# Patient Record
Sex: MALE | Race: BLACK OR AFRICAN AMERICAN | Employment: PART TIME | URBAN - METROPOLITAN AREA
[De-identification: names, ages, dates, MRNs, and addresses within clinical notes are randomized per-mention and may not be internally consistent; named-entity substitution may affect disease eponyms.]

---

## 2021-04-20 ENCOUNTER — HOSPITAL ENCOUNTER (EMERGENCY)
Age: 41
Discharge: HOME | End: 2021-04-20

## 2021-04-20 VITALS
BODY MASS INDEX: 27.16 KG/M2 | OXYGEN SATURATION: 96 % | HEART RATE: 76 BPM | TEMPERATURE: 98.5 F | DIASTOLIC BLOOD PRESSURE: 74 MMHG | RESPIRATION RATE: 18 BRPM | SYSTOLIC BLOOD PRESSURE: 138 MMHG | WEIGHT: 169 LBS | HEIGHT: 66 IN

## 2021-04-20 DIAGNOSIS — N34.2 URETHRITIS: ICD-10-CM

## 2021-04-20 DIAGNOSIS — Z20.2 POSSIBLE EXPOSURE TO STD: Primary | ICD-10-CM

## 2021-04-20 LAB
BACTERIA: ABNORMAL /HPF
BILIRUBIN URINE: NEGATIVE
BLOOD, URINE: ABNORMAL
CLARITY: ABNORMAL
COLOR: YELLOW
GLUCOSE URINE: NEGATIVE MG/DL
KETONES, URINE: ABNORMAL MG/DL
LEUKOCYTE ESTERASE, URINE: ABNORMAL
NITRITE, URINE: NEGATIVE
PH UA: 5.5 (ref 5–9)
PROTEIN UA: ABNORMAL MG/DL
RBC UA: ABNORMAL /HPF (ref 0–2)
SPECIFIC GRAVITY UA: >=1.03 (ref 1–1.03)
UROBILINOGEN, URINE: 0.2 E.U./DL
WBC UA: ABNORMAL /HPF (ref 0–5)

## 2021-04-20 PROCEDURE — 96372 THER/PROPH/DIAG INJ SC/IM: CPT

## 2021-04-20 PROCEDURE — 99283 EMERGENCY DEPT VISIT LOW MDM: CPT

## 2021-04-20 PROCEDURE — 87591 N.GONORRHOEAE DNA AMP PROB: CPT

## 2021-04-20 PROCEDURE — 6370000000 HC RX 637 (ALT 250 FOR IP): Performed by: PHYSICIAN ASSISTANT

## 2021-04-20 PROCEDURE — 6360000002 HC RX W HCPCS: Performed by: PHYSICIAN ASSISTANT

## 2021-04-20 PROCEDURE — 87491 CHLMYD TRACH DNA AMP PROBE: CPT

## 2021-04-20 PROCEDURE — 2500000003 HC RX 250 WO HCPCS: Performed by: PHYSICIAN ASSISTANT

## 2021-04-20 PROCEDURE — 81001 URINALYSIS AUTO W/SCOPE: CPT

## 2021-04-20 RX ORDER — ONDANSETRON 4 MG/1
4 TABLET, ORALLY DISINTEGRATING ORAL ONCE
Status: COMPLETED | OUTPATIENT
Start: 2021-04-20 | End: 2021-04-20

## 2021-04-20 RX ORDER — AZITHROMYCIN 250 MG/1
1000 TABLET, FILM COATED ORAL ONCE
Status: COMPLETED | OUTPATIENT
Start: 2021-04-20 | End: 2021-04-20

## 2021-04-20 RX ADMIN — ONDANSETRON 4 MG: 4 TABLET, ORALLY DISINTEGRATING ORAL at 13:17

## 2021-04-20 RX ADMIN — AZITHROMYCIN 1000 MG: 250 TABLET, FILM COATED ORAL at 13:17

## 2021-04-20 RX ADMIN — LIDOCAINE HYDROCHLORIDE 500 MG: 10 INJECTION, SOLUTION EPIDURAL; INFILTRATION; INTRACAUDAL; PERINEURAL at 13:18

## 2021-04-20 NOTE — ED PROVIDER NOTES
[address]  Department of Emergency Medicine   ED  Encounter Note  Admit Date/RoomTime: 2021 12:48 PM  ED Room: [facility]    NAME: Kacey Tao  : 1980  MRN: [id number]     Chief Complaint:  Exposure to STD (pt states white drainage from penis when using bathroom this am)    History of Present Illness      Kacey Tao is a 41 y.o. old male who presents to the emergency department by private vehicle, for evaluation of dysuria and penile discharge beginning yesterday. The pt reports that he had a new female sexual partner 5 days ago and did not use protection. The pt notes the discharge is white. Since exposure/onset there has been the development of symptoms including dysuria and discharge. He denies any genital rash, genital ulcers, scrotal pain, fever, abdominal pain, back pain, urinary frequency or hematuria. His prior STD history: Chlamydia. ROS   Pertinent positives and negatives are stated within HPI, all other systems reviewed and are negative. Past Medical History:  has no past medical history on file. Surgical History:  has no past surgical history on file. Social History:  reports that he has never smoked. He has never used smokeless tobacco. He reports current alcohol use of about 1.0 standard drinks of alcohol per week. He reports that he does not use drugs. Family History: family history is not on file. Allergies: Patient has no known allergies. Physical Exam   Oxygen Saturation Interpretation: Normal.        ED Triage Vitals   BP Temp Temp Source Pulse Resp SpO2 Height Weight   21 1249 21 1227 21 1249 21 1227 21 1249 21 1227 21 1249 21 1249   (!) 149/81 97.8 °F (36.6 °C) Temporal 81 18 96 % 5' 6\" (1.676 m) 169 lb (76.7 kg)         · Constitutional:  Alert, development consistent with age. In no acute distress. · HEENT:  NC/NT. Airway patent  · Neck:  Normal ROM. Supple. · Respiratory:  Lungs Clear to auscultation and breath sounds equal.  · CV:  Regular rate and rhythm, normal heart sounds, without pathological murmurs, ectopy, gallops, or rubs. · GI:  AbdomenSoft, nontender, good bowel sounds. No firm or pulsatile mass. · : RN was present at time of exam         Penis: discharge white . No ulceration, scrotal masses, abnormalities noted         Scrotum: normal.           Testicle: normal without masses bilateral.  · Integument:  Normal turgor. Warm, dry, without visible rash, unless noted elsewhere.  Lymphatics: No lymphangitis or adenopathy noted. · Neurological:  Orientation age-appropriate. Motor functions intact. Lab / Imaging Results   (All laboratory and radiology results have been personally reviewed by myself)  Labs:  Results for orders placed or performed during the hospital encounter of 04/20/21   C.trachomatis N.gonorrhoeae DNA, Urine    Specimen: Urine   Result Value Ref Range    Source Urine    Urinalysis with Microscopic   Result Value Ref Range    Color, UA Yellow Straw/Yellow    Clarity, UA SL CLOUDY Clear    Glucose, Ur Negative Negative mg/dL    Bilirubin Urine Negative Negative    Ketones, Urine TRACE (A) Negative mg/dL    Specific Gravity, UA >=1.030 1.005 - 1.030    Blood, Urine TRACE-INTACT Negative    pH, UA 5.5 5.0 - 9.0    Protein, UA TRACE Negative mg/dL    Urobilinogen, Urine 0.2 <2.0 E.U./dL    Nitrite, Urine Negative Negative    Leukocyte Esterase, Urine SMALL (A) Negative    WBC, UA 2-5 0 - 5 /HPF    RBC, UA 2-5 0 - 2 /HPF    Bacteria, UA RARE (A) None Seen /HPF     Imaging: All Radiology results interpreted by Radiologist unless otherwise noted.   No orders to display     ED Course / Medical Decision Making     Medications   cefTRIAXone (ROCEPHIN) 500 mg in lidocaine 1 % 1.43 mL IM Injection (500 mg Intramuscular Given 4/20/21 1318)   azithromycin (ZITHROMAX) tablet 1,000 mg (1,000 mg Oral Given 4/20/21 1317)   ondansetron (ZOFRAN-ODT) disintegrating tablet 4 mg (4 mg Oral Given 4/20/21 2451)        Consult(s):   None    Procedure(s):   none    Medical Decision Making:   Pt is a 40 y/o male presenting to the ED for evaluation of penile discharge and dysuria beginning yesterday. The pt had a new sexual partner 5 days ago and did not use protection. The pt denies genital rash, genital ulcers, scrotal pain, fever, abdominal pain, back pain, urinary frequency or hematuria. On exam, pt noted to have white penile discharge. No lesions or ulcers noted, no testicular tenderness. UA consistent with STD, no concern for UTI at this time. Cultures were sent. Pt instructed to check MyChart for updated results. Pt would like to be treated prophylactically. Pt given ceftriaxone, azithromycin, and zofran. Plan to obtain cultures and treat for suspected STD and provide outpatient follow-up instructions. Pt educated on safe sex practices. Pt instructed to avoid sexual intercourse for the next 2 weeks. Advised pt to check MyChart and if positive, to share with his partner. Pt should return to the ED for new or worsening symptoms like urinary retention, penile pain, fever, chills, abdominal pain, back pain. Patient verbalized understanding and was in agreement with plan of discharge. Patient was explicitly instructed on specific signs and symptoms on which to return to the emergency room for. Patient was instructed to return to the ER for any new or worsening symptoms. Additional discharge instructions were given verbally. All questions were answered. Patient is comfortable and agreeable with discharge plan. Patient in no acute distress and non-toxic in appearance. Plan of Care/Counseling:  I reviewed today's visit with the patient in addition to providing specific details for the plan of care and counseling regarding the diagnosis and prognosis. Questions are answered at this time and are agreeable with the plan. Assessment     1.  Possible

## 2021-04-23 LAB
C. TRACHOMATIS DNA ,URINE: ABNORMAL
N. GONORRHOEAE DNA, URINE: ABNORMAL
SOURCE: ABNORMAL

## 2021-07-19 ENCOUNTER — HOSPITAL ENCOUNTER (EMERGENCY)
Age: 41
Discharge: LEFT BEFORE DISCHARGE | End: 2021-07-19

## 2021-07-19 VITALS — HEART RATE: 86 BPM | OXYGEN SATURATION: 97 % | RESPIRATION RATE: 16 BRPM | TEMPERATURE: 97.1 F

## 2021-07-20 ENCOUNTER — HOSPITAL ENCOUNTER (EMERGENCY)
Age: 41
Discharge: HOME | End: 2021-07-20
Attending: EMERGENCY MEDICINE

## 2021-07-20 VITALS
BODY MASS INDEX: 27.16 KG/M2 | DIASTOLIC BLOOD PRESSURE: 78 MMHG | HEIGHT: 66 IN | TEMPERATURE: 97.8 F | OXYGEN SATURATION: 99 % | WEIGHT: 169 LBS | SYSTOLIC BLOOD PRESSURE: 121 MMHG | RESPIRATION RATE: 16 BRPM | HEART RATE: 82 BPM

## 2021-07-20 DIAGNOSIS — A64 STD (MALE): Primary | ICD-10-CM

## 2021-07-20 LAB
BACTERIA: ABNORMAL /HPF
BILIRUBIN URINE: NEGATIVE
BLOOD, URINE: ABNORMAL
CLARITY: ABNORMAL
COLOR: YELLOW
GLUCOSE URINE: NEGATIVE MG/DL
KETONES, URINE: NEGATIVE MG/DL
LEUKOCYTE ESTERASE, URINE: ABNORMAL
NITRITE, URINE: NEGATIVE
PH UA: 6 (ref 5–9)
PROTEIN UA: NEGATIVE MG/DL
RBC UA: ABNORMAL /HPF (ref 0–2)
SPECIFIC GRAVITY UA: 1.02 (ref 1–1.03)
UROBILINOGEN, URINE: 1 E.U./DL
WBC UA: ABNORMAL /HPF (ref 0–5)

## 2021-07-20 PROCEDURE — 87591 N.GONORRHOEAE DNA AMP PROB: CPT

## 2021-07-20 PROCEDURE — 81001 URINALYSIS AUTO W/SCOPE: CPT

## 2021-07-20 PROCEDURE — 99283 EMERGENCY DEPT VISIT LOW MDM: CPT

## 2021-07-20 PROCEDURE — 96372 THER/PROPH/DIAG INJ SC/IM: CPT

## 2021-07-20 PROCEDURE — 6370000000 HC RX 637 (ALT 250 FOR IP): Performed by: EMERGENCY MEDICINE

## 2021-07-20 PROCEDURE — 87491 CHLMYD TRACH DNA AMP PROBE: CPT

## 2021-07-20 PROCEDURE — 2500000003 HC RX 250 WO HCPCS: Performed by: EMERGENCY MEDICINE

## 2021-07-20 PROCEDURE — 6360000002 HC RX W HCPCS: Performed by: EMERGENCY MEDICINE

## 2021-07-20 RX ORDER — DOXYCYCLINE HYCLATE 100 MG/1
100 CAPSULE ORAL ONCE
Status: COMPLETED | OUTPATIENT
Start: 2021-07-20 | End: 2021-07-20

## 2021-07-20 RX ORDER — DOXYCYCLINE HYCLATE 100 MG
100 TABLET ORAL 2 TIMES DAILY
Qty: 14 TABLET | Refills: 0 | Status: SHIPPED | OUTPATIENT
Start: 2021-07-20 | End: 2021-07-27

## 2021-07-20 RX ADMIN — LIDOCAINE HYDROCHLORIDE 500 MG: 10 INJECTION, SOLUTION EPIDURAL; INFILTRATION; INTRACAUDAL; PERINEURAL at 08:51

## 2021-07-20 RX ADMIN — DOXYCYCLINE HYCLATE 100 MG: 100 CAPSULE ORAL at 08:51

## 2021-07-20 NOTE — PROGRESS NOTES
CLINICAL PHARMACY NOTE: MEDS TO BEDS    Total # of Prescriptions Filled: 1   The following medications were delivered to the patient:  · Doxycycline hyclate 100 mg    Additional Documentation:

## 2021-07-20 NOTE — ED NOTES
To [facility] area to provide specimen cup for ordered UA. Pt noted to be absent from assigned area. This nurse to speak with [facility] staff. Per [facility] staff, pt reported that he had to run to his car for something.      Boom Bejarano RN  07/20/21 0700

## 2021-07-22 LAB
C. TRACHOMATIS DNA ,URINE: NEGATIVE
N. GONORRHOEAE DNA, URINE: ABNORMAL
SOURCE: ABNORMAL

## 2021-07-22 NOTE — ED PROVIDER NOTES
DAVID Cronin is a 35 y.o. male with a PMHx significant for no chronic medical problems who presents with concerns for an STD. His complaint is persistent, mild in severity and worsened by urination. He states he had unprotected sex with a female partner 4-5 days ago and has since developed dysuria and abnormal drainage from his urethra. He denies prior history of STDs. He also denies recent trauma, fever, chills, fatigue, HA, dizziness, vision changes, congestion, rhinorrhea, neck pain, chest pain, palpitations, hx of MI, hx of blood clots, LE edema, SOB, cough, wheezing, abdominal pain, N/V/D/C, hematochezia, melena, hematuria, generalized weakness and paresthesias. The patient is currently taking no blood thinners. Tobacco Hx:   reports that he has never smoked. He has never used smokeless tobacco.    Alcohol Hx:   reports current alcohol use of about 1.0 standard drinks of alcohol per week. Illicit Drug Hx:  Reports no hx of illicit drug use. The history is provided by the patient. Last Tetanus (if applicable): n/a    Review of Systems   Constitutional: Negative for chills, diaphoresis, fatigue and fever. HENT: Negative for congestion, rhinorrhea and sore throat. Eyes: Negative for pain, discharge and redness. Respiratory: Negative for cough, shortness of breath and wheezing. Cardiovascular: Negative for chest pain, palpitations and leg swelling. Gastrointestinal: Negative for abdominal distention, abdominal pain, blood in stool, constipation, diarrhea, nausea and vomiting. Genitourinary: Positive for discharge, dysuria and penile pain. Negative for difficulty urinating, flank pain, frequency, hematuria, scrotal swelling and testicular pain. Musculoskeletal: Negative for arthralgias, back pain, myalgias and neck pain. Skin: Negative for rash and wound. Neurological: Negative for dizziness, syncope, speech difficulty, weakness, light-headedness, numbness and headaches. Physical Exam  Vitals and nursing note reviewed. Constitutional:       General: He is awake. He is not in acute distress. Appearance: He is not diaphoretic. HENT:      Head: Normocephalic and atraumatic. Right Ear: External ear normal.      Left Ear: External ear normal.      Nose: Nose normal.      Mouth/Throat:      Mouth: Mucous membranes are moist.      Pharynx: Oropharynx is clear. Eyes:      General: No scleral icterus. Right eye: No discharge. Left eye: No discharge. Extraocular Movements: Extraocular movements intact. Conjunctiva/sclera: Conjunctivae normal.   Cardiovascular:      Rate and Rhythm: Normal rate and regular rhythm. Heart sounds: Normal heart sounds. No murmur heard. No friction rub. No gallop. Comments: Upper extremity and lower extremity distal pulses intact bilaterally +2/4  Pulmonary:      Effort: Pulmonary effort is normal. No respiratory distress. Breath sounds: Normal breath sounds. No wheezing, rhonchi or rales. Comments: Good air movement noted throughout. Abdominal:      General: Bowel sounds are normal. There is no distension. Palpations: Abdomen is soft. Tenderness: There is no abdominal tenderness. There is no guarding or rebound. Hernia: There is no hernia in the left inguinal area or right inguinal area. Genitourinary:     Penis: Circumcised. Tenderness and discharge (white discharge) present. No swelling or lesions. Testes: Normal.         Right: Mass, tenderness or swelling not present. Left: Mass, tenderness or swelling not present. Epididymis:      Right: Normal. No tenderness. Left: Normal. No tenderness. Musculoskeletal:         General: No tenderness or deformity. Normal range of motion. Cervical back: Normal range of motion and neck supple. No rigidity. No muscular tenderness. Right lower leg: No edema. Left lower leg: No edema.    Lymphadenopathy: Cervical: No cervical adenopathy. Skin:     General: Skin is warm and dry. Capillary Refill: Capillary refill takes less than 2 seconds. Findings: No erythema or rash. Neurological:      General: No focal deficit present. Mental Status: He is alert and oriented to person, place, and time. Sensory: No sensory deficit. Motor: No weakness. Coordination: Coordination normal.          ---------------------------------- PAST HISTORY ---------------------------------------------  Past Medical History:  has no past medical history on file. Past Surgical History:  has no past surgical history on file. Social History:  reports that he has never smoked. He has never used smokeless tobacco. He reports current alcohol use of about 1.0 standard drinks of alcohol per week. He reports that he does not use drugs. Family History: family history is not on file. Home Meds: Not in a hospital admission. The patients home medications have been reviewed. Allergies: Patient has no known allergies. ------------------------- NURSING NOTES AND VITALS REVIEWED ---------------------------  Date / Time Roomed:  7/20/2021  6:17 AM  ED Bed Assignment:  ST04/ST-4    The nursing notes within the ED encounter and vital signs as below have been reviewed. /78   Pulse 82   Temp 97.8 °F (36.6 °C)   Resp 16   Ht 5' 6\" (1.676 m)   Wt 169 lb (76.7 kg)   SpO2 99%   BMI 27.28 kg/m²   -------------------------------------------------- RESULTS / INTERVENTIONS -------------------------------------------------  All laboratory and radiology tests have been reviewed by this physician.     LABS:  Results for orders placed or performed during the hospital encounter of 07/20/21   C.trachomatis N.gonorrhoeae DNA, Urine    Specimen: Urine   Result Value Ref Range    Source Urine     C. trachomatis DNA ,Urine NEGATIVE NEG    N. gonorrhoeae DNA, Urine SEE BELOW (A) NEG   Urinalysis, reflex to microscopic   Result Value Ref Range    Color, UA Yellow Straw/Yellow    Clarity, UA SL CLOUDY Clear    Glucose, Ur Negative Negative mg/dL    Bilirubin Urine Negative Negative    Ketones, Urine Negative Negative mg/dL    Specific Gravity, UA 1.025 1.005 - 1.030    Blood, Urine TRACE-INTACT Negative    pH, UA 6.0 5.0 - 9.0    Protein, UA Negative Negative mg/dL    Urobilinogen, Urine 1.0 <2.0 E.U./dL    Nitrite, Urine Negative Negative    Leukocyte Esterase, Urine MODERATE (A) Negative   Microscopic Urinalysis   Result Value Ref Range    WBC, UA 10-20 (A) 0 - 5 /HPF    RBC, UA 1-3 0 - 2 /HPF    Bacteria, UA FEW (A) None Seen /HPF       RADIOLOGY: Interpreted by Radiologist unless otherwise noted. No orders to display       Oxygen Saturation Interpretation: Normal    Meds Given:  Medications   cefTRIAXone (ROCEPHIN) 500 mg in lidocaine 1 % 1.43 mL IM Injection (500 mg Intramuscular Given 7/20/21 0851)   doxycycline hyclate (VIBRAMYCIN) capsule 100 mg (100 mg Oral Given 7/20/21 0851)       Procedures:  No procedures performed. --------------------------------- PROGRESS NOTES / ADDITIONAL PROVIDER NOTES ---------------------------------  Consultations:  As outlined below. ED Course:     3070: All results were discussed with the patient and I have provided specific details regarding the plan of care, diagnosis and associated prognosis. The patient tolerated the visit well and, at the time of discharge, he was without objective evidence of hemodynamic instability or an acute process (biological or psychological) requiring hospitalization and inpatient management. The patient was seen by myself and the assigned attending physician, Dr. Esther Rasmussen, who agreed with my assessment and plan as laid out herein. The importance of follow-up was discussed at the end of the visit and I recommended that the patient be seen by his primary care physician in 2-3 days.  The patient verbalized his understanding and agreement with the plan as presented and stated his intention to follow up. Reasons to return to the ER or seek immediate evaluation by a medical provider were discussed at length and all questions were answered. The patient was discharged home in stable condition. MDM:  Patient presented from home complaining of dysuria and penile drainage. On arrival, all VS were wnl. Physical exam was as documented above. A UA and STD swab was done to further evaluate his presenting complaints. Urine GC/Chlam was positive for ghonorrhea. The patient was given IM rocephin and an initial dose of doxy. He was discharged in stable condition with a prescription for doxy and directions to abstain from sex for at least the next week. Discharge Medication List as of 7/20/2021  8:51 AM      START taking these medications    Details   doxycycline hyclate (VIBRA-TABS) 100 MG tablet Take 1 tablet by mouth 2 times daily for 7 days, Disp-14 tablet, R-0Print             Diagnosis:  1. STD (male)        Disposition:  Patient's disposition: Discharge to home  Patient's condition is stable. This patient was seen, examined and treated with Dr. Roxi Galvez. All pertinent aspects of the patient's care were discussed with the attending physician.        Talon Bergeron,   Resident  07/25/21 0591

## 2021-07-25 ASSESSMENT — ENCOUNTER SYMPTOMS
NAUSEA: 0
WHEEZING: 0
EYE DISCHARGE: 0
EYE PAIN: 0
EYE REDNESS: 0
COUGH: 0
BACK PAIN: 0
DIARRHEA: 0
RHINORRHEA: 0
CONSTIPATION: 0
BLOOD IN STOOL: 0
VOMITING: 0
ABDOMINAL PAIN: 0
SHORTNESS OF BREATH: 0
SORE THROAT: 0
ABDOMINAL DISTENTION: 0

## 2021-09-26 ENCOUNTER — HOSPITAL ENCOUNTER (EMERGENCY)
Age: 41
Discharge: HOME | End: 2021-09-26

## 2021-09-26 VITALS
HEART RATE: 77 BPM | OXYGEN SATURATION: 96 % | SYSTOLIC BLOOD PRESSURE: 172 MMHG | RESPIRATION RATE: 16 BRPM | TEMPERATURE: 98 F | DIASTOLIC BLOOD PRESSURE: 108 MMHG

## 2021-09-26 DIAGNOSIS — Z71.1 CONCERN ABOUT STD IN MALE WITHOUT DIAGNOSIS: Primary | ICD-10-CM

## 2021-09-26 LAB
BACTERIA: ABNORMAL /HPF
BILIRUBIN URINE: NEGATIVE
BLOOD, URINE: ABNORMAL
CLARITY: CLEAR
COLOR: YELLOW
GLUCOSE URINE: NEGATIVE MG/DL
KETONES, URINE: ABNORMAL MG/DL
LEUKOCYTE ESTERASE, URINE: ABNORMAL
NITRITE, URINE: NEGATIVE
PH UA: 6 (ref 5–9)
PROTEIN UA: NEGATIVE MG/DL
RBC UA: ABNORMAL /HPF (ref 0–2)
SPECIFIC GRAVITY UA: 1.02 (ref 1–1.03)
UROBILINOGEN, URINE: 0.2 E.U./DL
WBC UA: >20 /HPF (ref 0–5)

## 2021-09-26 PROCEDURE — 99283 EMERGENCY DEPT VISIT LOW MDM: CPT

## 2021-09-26 PROCEDURE — 81001 URINALYSIS AUTO W/SCOPE: CPT

## 2021-09-26 PROCEDURE — 6370000000 HC RX 637 (ALT 250 FOR IP): Performed by: NURSE PRACTITIONER

## 2021-09-26 PROCEDURE — 6360000002 HC RX W HCPCS: Performed by: NURSE PRACTITIONER

## 2021-09-26 PROCEDURE — 87491 CHLMYD TRACH DNA AMP PROBE: CPT

## 2021-09-26 PROCEDURE — 96372 THER/PROPH/DIAG INJ SC/IM: CPT

## 2021-09-26 PROCEDURE — 87591 N.GONORRHOEAE DNA AMP PROB: CPT

## 2021-09-26 RX ORDER — CEFTRIAXONE 1 G/1
500 INJECTION, POWDER, FOR SOLUTION INTRAMUSCULAR; INTRAVENOUS ONCE
Status: COMPLETED | OUTPATIENT
Start: 2021-09-26 | End: 2021-09-26

## 2021-09-26 RX ORDER — AZITHROMYCIN 250 MG/1
1000 TABLET, FILM COATED ORAL ONCE
Status: COMPLETED | OUTPATIENT
Start: 2021-09-26 | End: 2021-09-26

## 2021-09-26 RX ADMIN — AZITHROMYCIN 1000 MG: 250 TABLET, FILM COATED ORAL at 19:24

## 2021-09-26 RX ADMIN — CEFTRIAXONE 500 MG: 1 INJECTION, POWDER, FOR SOLUTION INTRAMUSCULAR; INTRAVENOUS at 19:25

## 2021-09-26 ASSESSMENT — PAIN SCALES - GENERAL
PAINLEVEL_OUTOF10: 3
PAINLEVEL_OUTOF10: 3

## 2021-09-26 ASSESSMENT — PAIN DESCRIPTION - ONSET: ONSET: GRADUAL

## 2021-09-26 ASSESSMENT — PAIN DESCRIPTION - PROGRESSION: CLINICAL_PROGRESSION: GRADUALLY WORSENING

## 2021-09-26 ASSESSMENT — PAIN DESCRIPTION - DESCRIPTORS: DESCRIPTORS: CONSTANT;DISCOMFORT;DULL

## 2021-09-26 ASSESSMENT — PAIN DESCRIPTION - LOCATION: LOCATION: PENIS

## 2021-09-26 ASSESSMENT — PAIN DESCRIPTION - PAIN TYPE: TYPE: ACUTE PAIN

## 2021-09-26 ASSESSMENT — PAIN DESCRIPTION - FREQUENCY: FREQUENCY: CONTINUOUS

## 2021-09-26 NOTE — ED PROVIDER NOTES
[address]  Department of Emergency Medicine   ED  Encounter Note  Admit Date/RoomTime: 2021  5:49 PM  ED Room: [address]    NAME: Dede Zendejas  : 1980  MRN: [id number]     Chief Complaint:  Exposure to STD    History of Present Illness      Dede Zendejas is a 35 y.o. old male who presents to the emergency department by private vehicle, for possible exposure to possible STD with penile discharge, which occured several day(s) prior to arrival.  Since exposure/onset there has been no worsening symptoms. He denies any genital rash, genital ulcers, scrotal mass, scrotal pain, fever, abdominal pain, back pain, dysuria or urinary frequency. ROS   Pertinent positives and negatives are stated within HPI, all other systems reviewed and are negative. Past Medical History:  has no past medical history on file. Surgical History:  has no past surgical history on file. Social History:  reports that he has never smoked. He has never used smokeless tobacco. He reports current alcohol use of about 1.0 standard drinks of alcohol per week. He reports that he does not use drugs. Family History: family history is not on file. Allergies: Patient has no known allergies. Physical Exam   Oxygen Saturation Interpretation: Normal.        ED Triage Vitals   BP Temp Temp Source Pulse Resp SpO2 Height Weight   21 1758 21 1747 21 1747 21 1747 21 1758 21 1747 -- --   (!) 172/108 98 °F (36.7 °C) Temporal 77 16 96 %           · Constitutional:  Alert, development consistent with age. · HEENT:  NC/NT. Airway patent  · Neck:  Normal ROM. Supple. · Respiratory:  Lungs Clear to auscultation and breath sounds equal.  · CV:  Regular rate and rhythm, normal heart sounds, without pathological murmurs, ectopy, gallops, or rubs. · GI:  AbdomenSoft, nontender, good bowel sounds. No firm or pulsatile mass.   · : Genital / Rectal examination deferred or declined by patient  · Integument:  Normal turgor. Warm, dry, without visible rash, unless noted elsewhere.  Lymphatics: No lymphangitis or adenopathy noted. · Neurological:  Orientation age-appropriate. Motor functions intact. Lab / Imaging Results   (All laboratory and radiology results have been personally reviewed by myself)  Labs:  Results for orders placed or performed during the hospital encounter of 09/26/21   C.trachomatis N.gonorrhoeae DNA, Urine    Specimen: Urine   Result Value Ref Range    Source Urine    Urinalysis with Microscopic   Result Value Ref Range    Color, UA Yellow Straw/Yellow    Clarity, UA Clear Clear    Glucose, Ur Negative Negative mg/dL    Bilirubin Urine Negative Negative    Ketones, Urine TRACE (A) Negative mg/dL    Specific Gravity, UA 1.025 1.005 - 1.030    Blood, Urine SMALL (A) Negative    pH, UA 6.0 5.0 - 9.0    Protein, UA Negative Negative mg/dL    Urobilinogen, Urine 0.2 <2.0 E.U./dL    Nitrite, Urine Negative Negative    Leukocyte Esterase, Urine LARGE (A) Negative    WBC, UA >20 (A) 0 - 5 /HPF    RBC, UA 0-1 0 - 2 /HPF    Bacteria, UA RARE (A) None Seen /HPF     Imaging: All Radiology results interpreted by Radiologist unless otherwise noted. No orders to display     ED Course / Medical Decision Making     Medications   cefTRIAXone (ROCEPHIN) injection 500 mg (500 mg IntraMUSCular Given 9/26/21 1925)   azithromycin (ZITHROMAX) tablet 1,000 mg (1,000 mg Oral Given 9/26/21 1924)        Consult(s):   None    Procedure(s):   none    Medical Decision Making:    Plan to obtain cultures and treat for suspected STD and provide outpatient follow-up instructions. Patient educated on safe sex practices and outpatient follow-up as well as how to obtain his culture results via [address] Educated on signs and symptoms which require emergent reevaluation.     Plan of Care/Counseling:  DELILAH Barragan CNP reviewed today's visit with the patient in addition to

## 2023-01-12 ENCOUNTER — HOSPITAL ENCOUNTER (EMERGENCY)
Age: 43
Discharge: HOME | End: 2023-01-12
Payer: MEDICAID

## 2023-01-12 VITALS
SYSTOLIC BLOOD PRESSURE: 198 MMHG | TEMPERATURE: 97.5 F | WEIGHT: 170 LBS | OXYGEN SATURATION: 99 % | HEIGHT: 66 IN | RESPIRATION RATE: 15 BRPM | HEART RATE: 67 BPM | BODY MASS INDEX: 27.32 KG/M2 | DIASTOLIC BLOOD PRESSURE: 120 MMHG

## 2023-01-12 DIAGNOSIS — Z71.1 CONCERN ABOUT STD IN MALE WITHOUT DIAGNOSIS: ICD-10-CM

## 2023-01-12 DIAGNOSIS — R36.9 PENILE DISCHARGE: Primary | ICD-10-CM

## 2023-01-12 DIAGNOSIS — I10 HYPERTENSION, UNSPECIFIED TYPE: ICD-10-CM

## 2023-01-12 LAB
BACTERIA: ABNORMAL /HPF
BILIRUBIN URINE: NEGATIVE
BLOOD, URINE: ABNORMAL
CLARITY: CLEAR
COLOR: YELLOW
GLUCOSE URINE: NEGATIVE MG/DL
KETONES, URINE: NEGATIVE MG/DL
LEUKOCYTE ESTERASE, URINE: ABNORMAL
NITRITE, URINE: NEGATIVE
PH UA: 6 (ref 5–9)
PROTEIN UA: NEGATIVE MG/DL
RBC UA: ABNORMAL /HPF (ref 0–2)
SOURCE: NORMAL
SPECIFIC GRAVITY UA: 1.02 (ref 1–1.03)
UROBILINOGEN, URINE: 0.2 E.U./DL
WBC UA: ABNORMAL /HPF (ref 0–5)

## 2023-01-12 PROCEDURE — 87591 N.GONORRHOEAE DNA AMP PROB: CPT

## 2023-01-12 PROCEDURE — 2580000003 HC RX 258: Performed by: PHYSICIAN ASSISTANT

## 2023-01-12 PROCEDURE — 6370000000 HC RX 637 (ALT 250 FOR IP): Performed by: PHYSICIAN ASSISTANT

## 2023-01-12 PROCEDURE — 81001 URINALYSIS AUTO W/SCOPE: CPT

## 2023-01-12 PROCEDURE — 96372 THER/PROPH/DIAG INJ SC/IM: CPT

## 2023-01-12 PROCEDURE — 87491 CHLMYD TRACH DNA AMP PROBE: CPT

## 2023-01-12 PROCEDURE — 99284 EMERGENCY DEPT VISIT MOD MDM: CPT

## 2023-01-12 PROCEDURE — 6360000002 HC RX W HCPCS: Performed by: PHYSICIAN ASSISTANT

## 2023-01-12 RX ORDER — DOXYCYCLINE HYCLATE 100 MG
100 TABLET ORAL 2 TIMES DAILY
Qty: 14 TABLET | Refills: 0 | Status: SHIPPED | OUTPATIENT
Start: 2023-01-12 | End: 2023-01-19

## 2023-01-12 RX ORDER — DOXYCYCLINE HYCLATE 100 MG/1
100 CAPSULE ORAL ONCE
Status: COMPLETED | OUTPATIENT
Start: 2023-01-12 | End: 2023-01-12

## 2023-01-12 RX ADMIN — CEFTRIAXONE 500 MG: 1 INJECTION, POWDER, FOR SOLUTION INTRAMUSCULAR; INTRAVENOUS at 13:23

## 2023-01-12 RX ADMIN — DOXYCYCLINE HYCLATE 100 MG: 100 CAPSULE ORAL at 12:59

## 2023-01-12 ASSESSMENT — PAIN DESCRIPTION - LOCATION: LOCATION: PENIS

## 2023-01-12 ASSESSMENT — PAIN DESCRIPTION - DESCRIPTORS: DESCRIPTORS: ITCHING

## 2023-01-12 ASSESSMENT — PAIN - FUNCTIONAL ASSESSMENT
PAIN_FUNCTIONAL_ASSESSMENT: NONE - DENIES PAIN
PAIN_FUNCTIONAL_ASSESSMENT: NONE - DENIES PAIN
PAIN_FUNCTIONAL_ASSESSMENT: 0-10

## 2023-01-12 ASSESSMENT — PAIN SCALES - GENERAL: PAINLEVEL_OUTOF10: 4

## 2023-01-12 NOTE — ED PROVIDER NOTES
[facility]\""  Department of Emergency Medicine   ED  Encounter Note  Admit Date/RoomTime: 2023 12:31 PM  ED Room: [facility]    NAME: Raul Lozano  : 1980  MRN: [id number]     Chief Complaint:  Exposure to STD (Leaking discharge from penis )    History of Present Illness      Raul Lozano is a 38 y.o. old male who presents to the emergency department by private vehicle, for penile discharge starting today. Patient states he came in to get STI testing and has had a new partner. Patient has history of chlamydia and gonorrhea. Patient symptoms are mild in severity describes as a burning. Patient denies any making it better or worse. Denies fever/chills, headache, vision change, dizziness, chest pain, dyspnea, abdominal pain, NVD, numbness/weakness. ROS   Pertinent positives and negatives are stated within HPI, all other systems reviewed and are negative. Past Medical History:  has no past medical history on file. Surgical History:  has no past surgical history on file. Social History:  reports that he has never smoked. He has never used smokeless tobacco. He reports current alcohol use of about 1.0 standard drink per week. He reports that he does not use drugs. Family History: family history is not on file. Allergies: Patient has no known allergies. Physical Exam   Oxygen Saturation Interpretation: Normal.        ED Triage Vitals   BP Temp Temp Source Heart Rate Resp SpO2 Height Weight   23 1204 23 1153 23 1153 23 1153 23 1204 23 1153 23 1204 23 1204   (!) 166/114 97.5 °F (36.4 °C) Temporal 70 16 98 % 5' 6\" (1.676 m) 170 lb (77.1 kg)         Constitutional:  Alert, development consistent with age. HEENT:  NC/NT. Airway patent  Neck:  Normal ROM. Supple.   Respiratory:  Lungs Clear to auscultation and breath sounds equal.  CV:  Regular rate and rhythm, normal heart sounds, without pathological murmurs, ectopy, gallops, or rubs. GI:  AbdomenSoft, nontender, good bowel sounds. No firm or pulsatile mass. : Genital / Rectal examination deferred or declined by patient  Integument:  Normal turgor. Warm, dry, without visible rash, unless noted elsewhere. Lymphatics: No lymphangitis or adenopathy noted. Neurological:  Orientation age-appropriate. Motor functions intact. Lab / Imaging Results   (All laboratory and radiology results have been personally reviewed by myself)  Labs:  Results for orders placed or performed during the hospital encounter of 01/12/23   C.trachomatis N.gonorrhoeae DNA, Urine    Specimen: Urine   Result Value Ref Range    Source Urine    Urinalysis with Microscopic   Result Value Ref Range    Color, UA Yellow Straw/Yellow    Clarity, UA Clear Clear    Glucose, Ur Negative Negative mg/dL    Bilirubin Urine Negative Negative    Ketones, Urine Negative Negative mg/dL    Specific Gravity, UA 1.020 1.005 - 1.030    Blood, Urine TRACE-INTACT Negative    pH, UA 6.0 5.0 - 9.0    Protein, UA Negative Negative mg/dL    Urobilinogen, Urine 0.2 <2.0 E.U./dL    Nitrite, Urine Negative Negative    Leukocyte Esterase, Urine SMALL (A) Negative    WBC, UA 5-10 (A) 0 - 5 /HPF    RBC, UA 2-5 0 - 2 /HPF    Bacteria, UA NONE SEEN None Seen /HPF     Imaging: All Radiology results interpreted by Radiologist unless otherwise noted. No orders to display     ED Course / Medical Decision Making     Medications   doxycycline hyclate (VIBRAMYCIN) capsule 100 mg (100 mg Oral Given 1/12/23 1259)   cefTRIAXone (ROCEPHIN) 500 mg in sterile water 1.43 mL IM Injection (500 mg IntraMUSCular Given 1/12/23 1323)        Consult(s):   None    Procedure(s):   None    Medical Decision Making: Patient presenting with penile discharge. Patient is in no acute distress, afebrile, nontoxic appearance. Patient declined  exam but had no abdominal tenderness.   Patient was found to have an elevated blood pressure reading in the ED however patient is asymptomatic. Patient does not take blood pressure medication. Patient will be given a PCP to follow-up with for blood pressure recheck and possible medication. Urinalysis performed and chlamydia and gonorrhea testing. Differential includes STI, UTI. Patient given IM Rocephin and his first dose of doxycycline. Patient will be sent with p.o. doxycycline recommend follow-up with PCP. Patient check MyChart for his results. Patient abstain from intercourse for 7 to 10 days. Recommend patient return to the ED with new or worsening symptoms. Plan of Care/Counseling:  SHAWNA Antony reviewed today's visit with the patient in addition to providing specific details for the plan of care and counseling regarding the diagnosis and prognosis. Questions are answered at this time and are agreeable with the plan. Assessment     1. Penile discharge    2. Concern about STD in male without diagnosis    3. Hypertension, unspecified type      Plan   Discharged home. Patient condition is stable    New Medications   There are no discharge medications for this patient. Electronically signed by SHAWNA Antony   DD: 1/12/23  **This report was transcribed using voice recognition software. Every effort was made to ensure accuracy; however, inadvertent computerized transcription errors may be present.   END OF ED PROVIDER NOTE      SHAWNA Antony  01/12/23 [id number]

## 2023-01-12 NOTE — ED NOTES
Nurse practitioner Satnam Yaw Lepe notified of patient blood pressure.      Marcelo Reid RN  01/12/23 [address], RN  01/12/23 [id number]

## 2023-01-12 NOTE — ED NOTES
Nurse practitioner Meredith notified of patient's vital signs again. Nurse practitioner cleared patient for discharge.      Dank Weems RN  01/12/23 [id number]

## 2023-01-13 NOTE — PROGRESS NOTES
CLINICAL PHARMACY NOTE: MEDS TO BEDS    Total # of Prescriptions Filled: 1   The following medications were delivered to the patient:  Doxycycline hyclate 100 mg tab  Picked up in the pharmacy    Additional Documentation: